# Patient Record
Sex: FEMALE | Race: WHITE | NOT HISPANIC OR LATINO | Employment: OTHER | URBAN - NONMETROPOLITAN AREA
[De-identification: names, ages, dates, MRNs, and addresses within clinical notes are randomized per-mention and may not be internally consistent; named-entity substitution may affect disease eponyms.]

---

## 2018-07-13 ENCOUNTER — APPOINTMENT (OUTPATIENT)
Dept: GENERAL RADIOLOGY | Facility: HOSPITAL | Age: 78
End: 2018-07-13
Attending: FAMILY MEDICINE
Payer: MEDICARE

## 2018-07-13 ENCOUNTER — HOSPITAL ENCOUNTER (EMERGENCY)
Facility: HOSPITAL | Age: 78
Discharge: HOME | End: 2018-07-13
Attending: FAMILY MEDICINE | Admitting: FAMILY MEDICINE
Payer: MEDICARE

## 2018-07-13 VITALS
OXYGEN SATURATION: 97 % | SYSTOLIC BLOOD PRESSURE: 130 MMHG | RESPIRATION RATE: 16 BRPM | TEMPERATURE: 98.1 F | DIASTOLIC BLOOD PRESSURE: 74 MMHG | HEART RATE: 64 BPM

## 2018-07-13 DIAGNOSIS — I49.3 PVC'S (PREMATURE VENTRICULAR CONTRACTIONS): ICD-10-CM

## 2018-07-13 LAB
TROPONIN I SERPL-MCNC: <0.015 UG/L (ref 0–0.04)
TSH SERPL DL<=0.005 MIU/L-ACNC: 1.43 MU/L (ref 0.4–4)

## 2018-07-13 PROCEDURE — 96360 HYDRATION IV INFUSION INIT: CPT

## 2018-07-13 PROCEDURE — 99285 EMERGENCY DEPT VISIT HI MDM: CPT | Mod: Z6 | Performed by: FAMILY MEDICINE

## 2018-07-13 PROCEDURE — 93010 ELECTROCARDIOGRAM REPORT: CPT | Performed by: INTERNAL MEDICINE

## 2018-07-13 PROCEDURE — 25000128 H RX IP 250 OP 636: Performed by: FAMILY MEDICINE

## 2018-07-13 PROCEDURE — 84484 ASSAY OF TROPONIN QUANT: CPT | Performed by: FAMILY MEDICINE

## 2018-07-13 PROCEDURE — 84443 ASSAY THYROID STIM HORMONE: CPT | Performed by: FAMILY MEDICINE

## 2018-07-13 PROCEDURE — 36415 COLL VENOUS BLD VENIPUNCTURE: CPT | Performed by: FAMILY MEDICINE

## 2018-07-13 PROCEDURE — 80053 COMPREHEN METABOLIC PANEL: CPT | Performed by: FAMILY MEDICINE

## 2018-07-13 PROCEDURE — 99285 EMERGENCY DEPT VISIT HI MDM: CPT | Mod: 25

## 2018-07-13 PROCEDURE — 93005 ELECTROCARDIOGRAM TRACING: CPT

## 2018-07-13 PROCEDURE — 85025 COMPLETE CBC W/AUTO DIFF WBC: CPT | Performed by: FAMILY MEDICINE

## 2018-07-13 PROCEDURE — 71045 X-RAY EXAM CHEST 1 VIEW: CPT | Mod: TC

## 2018-07-13 RX ORDER — SODIUM CHLORIDE 9 MG/ML
1000 INJECTION, SOLUTION INTRAVENOUS CONTINUOUS
Status: DISCONTINUED | OUTPATIENT
Start: 2018-07-13 | End: 2018-07-13 | Stop reason: HOSPADM

## 2018-07-13 RX ADMIN — SODIUM CHLORIDE 1000 ML: 9 INJECTION, SOLUTION INTRAVENOUS at 15:32

## 2018-07-13 RX ADMIN — SODIUM CHLORIDE 1000 ML: 9 INJECTION, SOLUTION INTRAVENOUS at 14:34

## 2018-07-13 NOTE — ED AVS SNAPSHOT
[facility]    [address] [place] [address]    Phone:  [phone]                                       Adrianna Ojeda   MRN: [id number]    Department:  [facility]   Date of Visit:  7/13/2018           Patient Information     Date Of Birth          1940        Your diagnoses for this visit were:     None       You were seen by Emily Baker MD.        Discharge Instructions       Adrianna,    The phone number for the emergency department is [phone].  Have your wound care nurse call me and I will discuss the photo with her.    Start taking your atenolol twice a day.       Review of your medicines      Our records show that you are taking the medicines listed below. If these are incorrect, please call your family doctor or clinic.        Dose / Directions Last dose taken    ATENOLOL PO   Dose:  50 mg        Take 50 mg by mouth daily   Refills:  0        ATORVASTATIN CALCIUM PO   Dose:  10 mg        Take 10 mg by mouth daily   Refills:  0        ESTRADIOL PO   Dose:  0.5 mg        Take 0.5 mg by mouth every 3 days   Refills:  0                Procedures and tests performed during your visit     CBC with platelets differential    Comprehensive metabolic panel    EKG 12-lead, tracing only    TSH with free T4 reflex    Troponin I    XR Chest Port 1 View      Orders Needing Specimen Collection     None      Pending Results     No orders found from 7/11/2018 to 7/14/2018.            Pending Culture Results     No orders found from 7/11/2018 to 7/14/2018.            Thank you for choosing [facility]       Thank you for choosing [facility] for your care. Our goal is always to provide you with excellent care. Hearing back from our patients is one way we can continue to improve our services. Please take a few minutes to complete the written survey that you may receive in the mail after you visit with us. Thank you!        [employer] Information     [employer] lets you send  "messages to your doctor, view your test results, renew your prescriptions, schedule appointments and more. To sign up, go to [email or web address] . Click on \"Log in\" on the left side of the screen, which will take you to the Welcome page. Then click on \"Sign up Now\" on the right side of the page.     You will be asked to enter the access code listed below, as well as some personal information. Please follow the directions to create your username and password.     Your access code is: [email or web address]  Expires: 10/11/2018  4:01 PM     Your access code will  in 90 days. If you need help or a new code, please call your [facility] clinic or [phone].        Care EveryWhere ID     This is your Care EveryWhere ID. This could be used by other organizations to access your [facility] medical records  [id number]        Equal Access to Services     [facility] : Hadii celine Mckeon, waaxda lubela, qaybta kaalmada sarahi, luma ayon . So [facility] [phone].    ATENCIÓN: Si habla español, tiene a castillo disposición servicios gratuitos de asistencia lingüística. Llame al [phone].    We comply with applicable federal civil rights laws and [place] laws. We do not discriminate on the basis of race, color, national origin, age, disability, sex, sexual orientation, or gender identity.            After Visit Summary       This is your record. Keep this with you and show to your community pharmacist(s) and doctor(s) at your next visit.                  "

## 2018-07-13 NOTE — DISCHARGE INSTRUCTIONS
Adrianna,    The phone number for the emergency department is [phone].  Have your wound care nurse call me and I will discuss the photo with her.    Start taking your atenolol twice a day.

## 2018-07-13 NOTE — ED NOTES
Ambulated to room 3 independently, accompanied by , gown placed, call light in reach.  Called wound nurse in [place], because having issues with stoma, advised to see wound nurse, did not see wound nurse here.  Told wound nurse that she was having fluttering in chest and upper stomach, also feeling fatigued for the past 2 weeks, advised to have it evaluated.  Denies chest pain, shortness of breath or nausea at this time.

## 2018-07-13 NOTE — PROGRESS NOTES
LOC: alert and oriented X3, very pleasant and forthcoming  Others present: Patient and her  Leon    Dx: chest pain    Lives with:  Leon  [facility] [place] [facility]; live at home in [place] remainder of the year  Transportation: she and Leon both drive    Homecare//[facility] Services:   None at this time  [place]: no       VA Referral line called: na    Primary PCP: Dr Edu Khan in the [facility]; locally has gone to the [facility] when needed for unexpected health needs  Health Care Directive:  She thinks she has one in the [facility]    Pharmacy: [facility] in [place]  Meds and appointments management: manages independently and without difficulty    Adequate Resources for needs (housing, utilities, food/med): yes  Household chores: shared with Leon, independent    ADLs: independent  Ambulation:independent, no device  Falls: none    Mental health: denies concern  Substance abuse: drinks 1-2 glasses of wine each night; does not otherwise use any substances  Exposure to violence/abuse: not asked  Stressors: none additional    Goals: she hopes to return home to the [facility] at discharge    Barriers: none known    HARMAN: none

## 2018-07-13 NOTE — ED PROVIDER NOTES
eMERGENCY dEPARTMENT eNCOUnter        CHIEF COMPLAINT    Chief Complaint   Patient presents with     Chest Pain     fluttering       HPI    Adrianna Ojeda is a 77 year old male who presents with fluttering in her chest for the last couple of weeks.  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  This was performed at [facility] in the [facility].  She and her  live in [place] but have Been up here.  She has been communicating with her ostomy nurse about some skin breakdown.  Today she called her nurse and mention the palpitations and was directed to come into the emergency department.  She does not have a cardiac history but has a history of hypertension.  She has been eating and drinking normally.  It should be noted that it has been extremely hot.    REVIEW OF SYSTEMS    Cardiac: no Chest Pain, No syncope  Respiratory: No cough or hemoptysis  GI: No Vomiting or Diarrhea  : No Dysuria or Hematuria  General: No Fever or Chills  All other systems reviewed and are negative.    PAST MEDICAL & SURGICAL HISTORY    History reviewed. No pertinent past medical history.  History reviewed. No pertinent surgical history.    CURRENT MEDICATIONS    Current Outpatient Rx   Medication Sig Dispense Refill     ATENOLOL PO Take 50 mg by mouth daily       ATORVASTATIN CALCIUM PO Take 10 mg by mouth daily       ESTRADIOL PO Take 0.5 mg by mouth every 3 days         ALLERGIES    No Known Allergies    SOCIAL & FAMILY HISTORY    Social History     Social History     Marital status:      Spouse name: N/A     Number of children: N/A     Years of education: N/A     Social History Main Topics     Smoking status: Not on file     Smokeless tobacco: Not on file     Alcohol use Not on file     Drug use: Not on file     Sexual activity: Not on file     Other Topics Concern     Not on file     Social History Narrative     No family  history on file.    PHYSICAL EXAM    VITAL SIGNS: /74  Pulse 64  Temp 98.1  F (36.7  C) (Oral)  Resp 16  SpO2 97%   Constitutional:  Well developed, well nourished, no acute distress   HENT:  Atraumatic, moist mucus membranes  Neck: supple, no JVD   Respiratory:  Lungs Clear, no retractions   Cardiovascular:  regular rate, no murmurs, occasional PVC  Vascular: Radial pulses 2+ equal bilaterally  GI:  Soft, nontender, normal bowel sounds, her ostomy appears clear.  There are's 2 small ulcerations in the 3 o'clock position as I review the ostomy.  No surrounding erythema.  Musculoskeletal:  No edema, no acute deformities  Integument:  Skin warm and dry, no petechiae   Neurologic:  Alert & oriented, no slurred speech  Psych: Pleasant affect, no hallucinations    EKG    Interpreted by emergency department physician:  Sinus rhythm with frequent PVCs and PACs.    RADIOLOGY/PROCEDURES    CXR:   Results for orders placed or performed during the hospital encounter of 07/13/18   XR Chest Port 1 View    Narrative    Procedure:XR CHEST PORT 1 VW    Clinical history:Male, 77 years, chest fluttering;     Technique: Single view was obtained.    Comparison: None    Findings: The cardiac silhouette is normal. The pulmonary vasculature  is normal.    The lungs demonstrate minimal atelectasis versus scarring at the left  lung base. Bony structures are unremarkable.      Impression    Impression:  1.   Minimal left basilar atelectasis/scarring. No evidence of CHF.    MINDY DIAZ MD       ED COURSE & MEDICAL DECISION MAKING    Pertinent Labs & Imaging studies reviewed and interpreted. (See chart for details)  The patient was given a liter of fluids with improvement in her PVCs.    See chart for details of medications given during the ED stay.    Vitals:    07/13/18 1400 07/13/18 1415 07/13/18 1430 07/13/18 1609   BP: 146/75 134/85 (!) 140/104 130/74   Pulse:       Resp:    16   Temp:    98.1  F (36.7  C)   TempSrc:     Oral   SpO2:  95% 95% 97%         FINAL IMPRESSION    1. PVC's (premature ventricular contractions)        Plan her symptoms have improved with administration of fluids.  She is on atenolol 50 mg a day.  We will have her increase that to twice daily her blood pressure was elevated here.  Instructions on fluids and rest.  Return here as needed.  We did take a photo of her ostomy site and texted that to her wound care nurse but we did not hear any reply.  The patient expects she will be contacting her when she returns home and call here if she needs anything.      (Please note that this note was completed with a voice recognition program.  Every attempt was made to edit the dictations, but inevitably there remain words that are mis-transcribed.)       Emily Baker MD  07/13/18 5832

## 2018-07-17 LAB
ALBUMIN SERPL-MCNC: 4.1 G/DL (ref 3.4–5)
ALP SERPL-CCNC: 84 U/L (ref 40–150)
ALT SERPL W P-5'-P-CCNC: 46 U/L (ref 0–50)
ANION GAP SERPL CALCULATED.3IONS-SCNC: 9 MMOL/L (ref 3–14)
AST SERPL W P-5'-P-CCNC: 23 U/L (ref 0–45)
BASOPHILS # BLD AUTO: 0 10E9/L (ref 0–0.2)
BASOPHILS NFR BLD AUTO: 0.5 %
BILIRUB SERPL-MCNC: 0.4 MG/DL (ref 0.2–1.3)
BUN SERPL-MCNC: 19 MG/DL (ref 7–30)
CALCIUM SERPL-MCNC: 9.4 MG/DL (ref 8.5–10.1)
CHLORIDE SERPL-SCNC: 108 MMOL/L (ref 94–109)
CO2 SERPL-SCNC: 27 MMOL/L (ref 20–32)
CREAT SERPL-MCNC: 0.95 MG/DL (ref 0.52–1.04)
DIFFERENTIAL METHOD BLD: NORMAL
EOSINOPHIL # BLD AUTO: 0.3 10E9/L (ref 0–0.7)
EOSINOPHIL NFR BLD AUTO: 3.1 %
ERYTHROCYTE [DISTWIDTH] IN BLOOD BY AUTOMATED COUNT: 13.5 % (ref 10–15)
GFR SERPL CREATININE-BSD FRML MDRD: 76 ML/MIN/1.7M2
GLUCOSE SERPL-MCNC: 104 MG/DL (ref 70–99)
HCT VFR BLD AUTO: 44 % (ref 35–47)
HGB BLD-MCNC: 14.7 G/DL (ref 11.7–15.7)
IMM GRANULOCYTES # BLD: 0 10E9/L (ref 0–0.4)
IMM GRANULOCYTES NFR BLD: 0.3 %
LYMPHOCYTES # BLD AUTO: 3 10E9/L (ref 0.8–5.3)
LYMPHOCYTES NFR BLD AUTO: 33.6 %
MCH RBC QN AUTO: 31.3 PG (ref 26.5–33)
MCHC RBC AUTO-ENTMCNC: 33.4 G/DL (ref 31.5–36.5)
MCV RBC AUTO: 94 FL (ref 78–100)
MONOCYTES # BLD AUTO: 0.7 10E9/L (ref 0–1.3)
MONOCYTES NFR BLD AUTO: 7.7 %
NEUTROPHILS # BLD AUTO: 4.9 10E9/L (ref 1.6–8.3)
NEUTROPHILS NFR BLD AUTO: 54.8 %
NRBC # BLD AUTO: 0 10*3/UL
NRBC BLD AUTO-RTO: 0 /100
PLATELET # BLD AUTO: 257 10E9/L (ref 150–450)
POTASSIUM SERPL-SCNC: 4.7 MMOL/L (ref 3.4–5.3)
PROT SERPL-MCNC: 7.5 G/DL (ref 6.8–8.8)
RBC # BLD AUTO: 4.7 10E12/L (ref 3.8–5.2)
SODIUM SERPL-SCNC: 144 MMOL/L (ref 133–144)
WBC # BLD AUTO: 8.8 10E9/L (ref 4–11)

## 2020-12-22 ENCOUNTER — RECORDS - HEALTHEAST (OUTPATIENT)
Dept: ADMINISTRATIVE | Facility: OTHER | Age: 80
End: 2020-12-22

## 2020-12-23 ENCOUNTER — HOSPITAL ENCOUNTER (OUTPATIENT)
Dept: CARDIOLOGY | Facility: HOSPITAL | Age: 80
Discharge: HOME | End: 2020-12-23

## 2020-12-23 DIAGNOSIS — R00.2 PALPITATIONS: ICD-10-CM

## 2021-05-25 ENCOUNTER — RECORDS - HEALTHEAST (OUTPATIENT)
Dept: ADMINISTRATIVE | Facility: CLINIC | Age: 81
End: 2021-05-25

## 2021-05-26 ENCOUNTER — RECORDS - HEALTHEAST (OUTPATIENT)
Dept: ADMINISTRATIVE | Facility: CLINIC | Age: 81
End: 2021-05-26

## 2021-05-30 ENCOUNTER — RECORDS - HEALTHEAST (OUTPATIENT)
Dept: ADMINISTRATIVE | Facility: CLINIC | Age: 81
End: 2021-05-30